# Patient Record
Sex: FEMALE | Race: WHITE
[De-identification: names, ages, dates, MRNs, and addresses within clinical notes are randomized per-mention and may not be internally consistent; named-entity substitution may affect disease eponyms.]

---

## 2018-02-05 ENCOUNTER — HOSPITAL ENCOUNTER (EMERGENCY)
Dept: HOSPITAL 74 - JER | Age: 63
Discharge: HOME | End: 2018-02-05
Payer: COMMERCIAL

## 2018-02-05 VITALS — TEMPERATURE: 100.2 F | SYSTOLIC BLOOD PRESSURE: 157 MMHG | HEART RATE: 97 BPM | DIASTOLIC BLOOD PRESSURE: 75 MMHG

## 2018-02-05 VITALS — BODY MASS INDEX: 25.3 KG/M2

## 2018-02-05 DIAGNOSIS — J10.1: Primary | ICD-10-CM

## 2018-02-05 NOTE — PDOC
Rapid Medical Evaluation


Time Seen by Provider: 02/05/18 20:53


Medical Evaluation: 





02/05/18 20:54


The patient presents with a chief complaint of: Shortness of breath/congestion. 

Diagnosed with the flu this afternoon. On Tamiflu


I have performed a brief in-person evaluation of this patient;


Pertinent physical exam findings: ambulatory, in no respiratory distress: CTAB, 

congestion. O2 97% on room air


 I have ordered the following: Motrin


The patient will proceed to the ED for further evaluation.











**Discharge Disposition





- Referrals


Referrals: 


Steve Saldivar [Primary Care Provider] - 





- Patient Instructions





- Post Discharge Activity

## 2018-02-05 NOTE — PDOC
History of Present Illness





- General


Chief Complaint: Cold Symptoms


Stated Complaint: S.O.B


Time Seen by Provider: 02/05/18 20:53


History Source: Patient





Past History





- Past Medical History


Allergies/Adverse Reactions: 


 Allergies











Allergy/AdvReac Type Severity Reaction Status Date / Time


 


No Known Allergies Allergy   Verified 02/05/18 20:54














- Suicide/Smoking/Psychosocial Hx


Smoking History: Never smoked





*Physical Exam





- Vital Signs


 Last Vital Signs











Temp Pulse Resp BP Pulse Ox


 


 100.2 F H  97 H  16   157/75   100 


 


 02/05/18 20:54  02/05/18 20:54  02/05/18 20:54  02/05/18 20:54  02/05/18 20:54














ED Treatment Course





- RADIOLOGY


Radiology Studies Ordered: 














 Category Date Time Status


 


 CHEST PA & LAT [RAD] Stat Radiology  02/05/18 21:11 Ordered














Medical Decision Making





- Medical Decision Making





02/05/18 21:12


32-year-old female, h/o HTN, currently on tamiflu after being diagnosed with 

the flu in PMD's office this a.m after presenting with dry cough with body aches

, nasal congestion, fever and chills..  States she decided to come to ED 

because the nasal congestion is making it difficult for her to breathe through 

her nose,  otherwise no shortness of breath, chest pain or hemoptysis.  Pt in 

no apparent distress with low-grade fever and clear chest/lungs.  Chest x-ray 

done and negative for obvious infiltrate on my read.  Will follow-up on final 

report.  Discharge with continuation of Tamiflu and supportive treatment.  

Reasons to return discussed with patient and son











*DC/Admit/Observation/Transfer


Diagnosis at time of Disposition: 


 Nasal congestion, Influenza








- Discharge Dispostion


Disposition: HOME


Condition at time of disposition: Good





- Referrals


Referrals: 


Steve Saldivar [Primary Care Provider] - 





- Patient Instructions


Printed Discharge Instructions:  Influenza


Additional Instructions: 


Take tamiflu as directed by your doctor.  Rest, maintain adequate hydration and 

use Afrin as needed for congestion but for no more than 3 days as using it for 

longer can result in worsening of your congestion.


Return to ER for worsening of symptoms, otherwise continue to follow-up with 

your PMD





- Post Discharge Activity